# Patient Record
Sex: FEMALE | Race: WHITE | Employment: OTHER | URBAN - NONMETROPOLITAN AREA
[De-identification: names, ages, dates, MRNs, and addresses within clinical notes are randomized per-mention and may not be internally consistent; named-entity substitution may affect disease eponyms.]

---

## 2019-07-10 ENCOUNTER — HOSPITAL ENCOUNTER (EMERGENCY)
Age: 44
Discharge: HOME | End: 2019-07-10
Attending: EMERGENCY MEDICINE
Payer: COMMERCIAL

## 2019-07-10 VITALS
BODY MASS INDEX: 26.68 KG/M2 | HEART RATE: 81 BPM | HEIGHT: 67 IN | DIASTOLIC BLOOD PRESSURE: 83 MMHG | TEMPERATURE: 97.9 F | WEIGHT: 170 LBS | RESPIRATION RATE: 18 BRPM | OXYGEN SATURATION: 99 % | SYSTOLIC BLOOD PRESSURE: 118 MMHG

## 2019-07-10 DIAGNOSIS — G51.0 BELL'S PALSY: Primary | ICD-10-CM

## 2019-07-10 PROCEDURE — 99283 EMERGENCY DEPT VISIT LOW MDM: CPT

## 2019-07-10 RX ORDER — PREDNISONE 20 MG/1
20 TABLET ORAL 2 TIMES DAILY
Qty: 10 TABLET | Refills: 0 | Status: SHIPPED | OUTPATIENT
Start: 2019-07-10 | End: 2019-07-15

## 2019-07-10 SDOH — HEALTH STABILITY: MENTAL HEALTH: HOW OFTEN DO YOU HAVE A DRINK CONTAINING ALCOHOL?: NEVER

## 2019-07-10 NOTE — ED TRIAGE NOTES
Pt to ED via intake with c/o of facial drooping pt states that last night after dinner she couldn't taste her food as well. PT reports she noticed her facial drooping. Pt denies hx of strokes or TIAs. NIH scale completed. Dr Yobany Wilkes at bedside.

## 2019-07-10 NOTE — ED PROVIDER NOTES
[facility]  eMERGENCY dEPARTMENT eNCOUnter          CHIEF COMPLAINT       Chief Complaint   Patient presents with    Facial Droop       Nurses Notes reviewed and I agree except as noted in the HPI. HISTORY OF PRESENT ILLNESS    Maura Hunter is a 37 y.o. female who presents to the ED for the evaluation of left sided facial droop. The patient reports that she initially started to experiencing increased tear production of the left eye last night. She states to have developed  and left sided facial droop which caused her to come to the ED for evaluation. She reports decreased taste of the left side of her mouth but denies to be experiencing any motor or sensory deficits of the left upper or left lower extremity. She denies any otalgia, dysphagia, or dysarthria. The patient reports no additional symptoms or complaints at this time. REVIEW OF SYSTEMS     Constitutional: no fever or chills  Eyes: no visual changes  ENT: no sore throat  Respiratory: no dyspnea  Cardiovascular: no chest pain  Gastrointestinal : no abdominal pain  Neurological: no focal weakness or numbness:   Remainder of review of systems is otherwise reviewed as negative. PAST MEDICAL HISTORY    has no past medical history on file. SURGICAL HISTORY      has no past surgical history on file. CURRENT MEDICATIONS       Discharge Medication List as of 7/10/2019 10:16 AM      CONTINUE these medications which have NOT CHANGED    Details   Multiple Vitamin (MULTI-VITAMIN DAILY PO) Take by mouthHistorical Med             ALLERGIES     has No Known Allergies. FAMILY HISTORY     has no family status information on file. family history is not on file. SOCIAL HISTORY      reports that she has never smoked. She has never used smokeless tobacco. She reports that she does not drink alcohol or use drugs. PHYSICAL EXAM     INITIAL VITALS:  height is 5' 7\" (1.702 m) and weight is 170 lb (77.1 kg).  Her oral temperature